# Patient Record
Sex: MALE | Race: WHITE | NOT HISPANIC OR LATINO | Employment: FULL TIME | URBAN - METROPOLITAN AREA
[De-identification: names, ages, dates, MRNs, and addresses within clinical notes are randomized per-mention and may not be internally consistent; named-entity substitution may affect disease eponyms.]

---

## 2020-09-08 ENCOUNTER — TELEPHONE (OUTPATIENT)
Dept: FAMILY MEDICINE | Facility: CLINIC | Age: 35
End: 2020-09-08

## 2020-09-08 NOTE — TELEPHONE ENCOUNTER
"Spoke with pt, I told him that since he is a new pt that he would need to be seen in clinic, but with the symptoms that he has and could be positive for covid, I had to tell him that it [facility] policy that we cant still see him virtually that he would need to go to urgent care. He was upset, he states its unfortunate and that he will deal with it himself, I did state that we will have to follow up with him after the covid test comes back and he stated " no im fine". I canceled the appointment and we ended call.    "

## 2020-09-09 ENCOUNTER — OFFICE VISIT (OUTPATIENT)
Dept: URGENT CARE | Facility: CLINIC | Age: 35
End: 2020-09-09
Payer: COMMERCIAL

## 2020-09-09 VITALS
OXYGEN SATURATION: 99 % | WEIGHT: 212 LBS | TEMPERATURE: 98 F | RESPIRATION RATE: 18 BRPM | DIASTOLIC BLOOD PRESSURE: 84 MMHG | HEART RATE: 87 BPM | BODY MASS INDEX: 32.13 KG/M2 | HEIGHT: 68 IN | SYSTOLIC BLOOD PRESSURE: 139 MMHG

## 2020-09-09 DIAGNOSIS — R05.9 COUGH: Primary | ICD-10-CM

## 2020-09-09 DIAGNOSIS — U07.1 COVID-19 VIRUS DETECTED: ICD-10-CM

## 2020-09-09 LAB
CTP QC/QA: YES
SARS-COV-2 RDRP RESP QL NAA+PROBE: POSITIVE

## 2020-09-09 PROCEDURE — 99203 PR OFFICE/OUTPT VISIT, NEW, LEVL III, 30-44 MIN: ICD-10-PCS | Mod: S$GLB,,, | Performed by: FAMILY MEDICINE

## 2020-09-09 PROCEDURE — U0002: ICD-10-PCS | Mod: S$GLB,,, | Performed by: FAMILY MEDICINE

## 2020-09-09 PROCEDURE — 99203 OFFICE O/P NEW LOW 30 MIN: CPT | Mod: S$GLB,,, | Performed by: FAMILY MEDICINE

## 2020-09-09 PROCEDURE — U0002 COVID-19 LAB TEST NON-CDC: HCPCS | Mod: S$GLB,,, | Performed by: FAMILY MEDICINE

## 2020-09-09 RX ORDER — ALBUTEROL SULFATE 90 UG/1
2 AEROSOL, METERED RESPIRATORY (INHALATION) EVERY 6 HOURS PRN
Qty: 18 G | Refills: 2 | Status: SHIPPED | OUTPATIENT
Start: 2020-09-09 | End: 2022-08-09

## 2020-09-09 RX ORDER — PROMETHAZINE HYDROCHLORIDE 6.25 MG/5ML
SYRUP ORAL
Qty: 120 ML | Refills: 1 | Status: SHIPPED | OUTPATIENT
Start: 2020-09-09 | End: 2022-08-09 | Stop reason: ALTCHOICE

## 2020-09-09 NOTE — PROGRESS NOTES
"Subjective:       Patient ID: Quinn Grey is a 35 y.o. male.    Vitals:  height is 5' 8" (1.727 m) and weight is 96.2 kg (212 lb). His temperature is 97.6 °F (36.4 °C). His blood pressure is 139/84 and his pulse is 87. His respiration is 18 and oxygen saturation is 99%.     Chief Complaint: Cough    Pt presents today with fatigue, productive cough, rhinorrhea, myalgias x1.5 weeks. Pt wife tested pos for covid about 2 weeks.    Cough  This is a new problem. The current episode started in the past 7 days. The problem has been gradually worsening. The problem occurs hourly. The cough is productive of sputum. Associated symptoms include headaches, myalgias, nasal congestion, postnasal drip and rhinorrhea. Pertinent negatives include no chest pain, chills, ear congestion, ear pain, fever, heartburn, hemoptysis, rash, sore throat, shortness of breath, sweats, weight loss or wheezing. Nothing aggravates the symptoms. Treatments tried: thermaflu. The treatment provided mild relief. There is no history of asthma, bronchiectasis, bronchitis, COPD, emphysema, environmental allergies or pneumonia.       Constitution: Negative for chills, fatigue and fever.   HENT: Positive for postnasal drip. Negative for ear pain, congestion and sore throat.    Neck: Negative for painful lymph nodes.   Cardiovascular: Negative for chest pain and leg swelling.   Eyes: Negative for double vision and blurred vision.   Respiratory: Positive for cough. Negative for bloody sputum, shortness of breath and wheezing.    Gastrointestinal: Negative for nausea, vomiting, diarrhea and heartburn.   Genitourinary: Negative for dysuria, frequency and urgency.   Musculoskeletal: Positive for muscle ache. Negative for joint pain, joint swelling and muscle cramps.   Skin: Negative for color change, pale and rash.   Allergic/Immunologic: Negative for environmental allergies and seasonal allergies.   Neurological: Positive for headaches. Negative for dizziness, " history of vertigo, light-headedness and passing out.   Hematologic/Lymphatic: Negative for swollen lymph nodes, easy bruising/bleeding and history of blood clots. Does not bruise/bleed easily.   Psychiatric/Behavioral: Negative for nervous/anxious, sleep disturbance and depression. The patient is not nervous/anxious.        Objective:      Physical Exam   Constitutional: He is oriented to person, place, and time. He appears well-developed.   HENT:   Head: Normocephalic and atraumatic.   Ears:   Right Ear: External ear normal.   Left Ear: External ear normal.   Nose: Nose normal.   Mouth/Throat: Oropharynx is clear and moist.   Eyes: Pupils are equal, round, and reactive to light. Conjunctivae, EOM and lids are normal.   Neck: Trachea normal, full passive range of motion without pain and phonation normal. Neck supple.   Cardiovascular: Normal rate.   Pulmonary/Chest: Effort normal and breath sounds normal.   Musculoskeletal: Normal range of motion.   Neurological: He is alert and oriented to person, place, and time.   Skin: Skin is warm, dry and intact. Psychiatric: His speech is normal and behavior is normal. Judgment and thought content normal.   Nursing note and vitals reviewed.        Assessment:       1. Cough        Plan:         Cough  -     POCT COVID-19 Rapid Screening    Other orders  -     albuterol (PROVENTIL/VENTOLIN HFA) 90 mcg/actuation inhaler; Inhale 2 puffs into the lungs every 6 (six) hours as needed for Wheezing. Rescue  Dispense: 18 g; Refill: 2  -     promethazine (PHENERGAN) 6.25 mg/5 mL syrup; 10mL at night as needed  Dispense: 120 mL; Refill: 1

## 2020-09-09 NOTE — LETTER
September 9, 2020      [facility] [address], [address]  [facility] [address]  Phone: [phone]  Fax: [phone]       Patient: Quinn Grey   YOB: 1985  Date of Visit: 09/09/2020    To Whom It May Concern:    Edmar Grey  was at [facility] on 09/09/2020.  If you have any questions or concerns, or if I can be of further assistance, please do not hesitate to contact me.    Sincerely,    James Jones MD

## 2021-01-04 RX ORDER — TRAMADOL HYDROCHLORIDE 50 MG/1
TABLET ORAL
COMMUNITY
Start: 2020-11-02 | End: 2022-08-09 | Stop reason: ALTCHOICE

## 2021-01-04 RX ORDER — AMOXICILLIN AND CLAVULANATE POTASSIUM 500; 125 MG/1; MG/1
TABLET, FILM COATED ORAL
COMMUNITY
Start: 2020-09-28 | End: 2022-08-09 | Stop reason: ALTCHOICE

## 2021-01-04 RX ORDER — AMOXICILLIN 500 MG/1
CAPSULE ORAL
COMMUNITY
Start: 2021-01-01 | End: 2022-08-09 | Stop reason: ALTCHOICE

## 2021-01-04 RX ORDER — IBUPROFEN 800 MG/1
800 TABLET ORAL EVERY 8 HOURS PRN
COMMUNITY
Start: 2021-01-01 | End: 2022-08-09 | Stop reason: ALTCHOICE

## 2021-01-04 RX ORDER — HYDROCODONE BITARTRATE AND ACETAMINOPHEN 5; 325 MG/1; MG/1
TABLET ORAL
COMMUNITY
Start: 2020-10-30 | End: 2022-08-09 | Stop reason: ALTCHOICE

## 2021-01-04 RX ORDER — BUPROPION HYDROCHLORIDE 150 MG/1
150 TABLET ORAL
COMMUNITY
Start: 2018-08-22 | End: 2022-11-08 | Stop reason: SDUPTHER

## 2021-01-04 RX ORDER — METHYLPREDNISOLONE 4 MG/1
TABLET ORAL
COMMUNITY
Start: 2021-01-01 | End: 2022-08-09 | Stop reason: ALTCHOICE

## 2021-05-10 ENCOUNTER — PATIENT MESSAGE (OUTPATIENT)
Dept: RESEARCH | Facility: HOSPITAL | Age: 36
End: 2021-05-10

## 2021-11-17 ENCOUNTER — OCCUPATIONAL HEALTH (OUTPATIENT)
Dept: URGENT CARE | Facility: CLINIC | Age: 36
End: 2021-11-17

## 2021-11-17 DIAGNOSIS — Z02.83 ENCOUNTER FOR DRUG SCREENING: Primary | ICD-10-CM

## 2021-11-17 LAB
CTP QC/QA: YES
POC 10 PANEL DRUG SCREEN: NEGATIVE

## 2021-11-17 PROCEDURE — 80305 POCT RAPID DRUG SCREEN 10 PANEL: ICD-10-PCS | Mod: S$GLB,,, | Performed by: FAMILY MEDICINE

## 2021-11-17 PROCEDURE — 80305 DRUG TEST PRSMV DIR OPT OBS: CPT | Mod: S$GLB,,, | Performed by: FAMILY MEDICINE

## 2021-12-28 ENCOUNTER — CLINICAL SUPPORT (OUTPATIENT)
Dept: URGENT CARE | Facility: CLINIC | Age: 36
End: 2021-12-28
Payer: COMMERCIAL

## 2021-12-28 DIAGNOSIS — Z20.822 COVID-19 RULED OUT: Primary | ICD-10-CM

## 2021-12-28 LAB
CTP QC/QA: YES
SARS-COV-2 RDRP RESP QL NAA+PROBE: NEGATIVE

## 2021-12-28 PROCEDURE — U0002 COVID-19 LAB TEST NON-CDC: HCPCS | Mod: QW,S$GLB,, | Performed by: FAMILY MEDICINE

## 2021-12-28 PROCEDURE — 99211 PR OFFICE/OUTPT VISIT, EST, LEVL I: ICD-10-PCS | Mod: S$GLB,,, | Performed by: FAMILY MEDICINE

## 2021-12-28 PROCEDURE — U0002: ICD-10-PCS | Mod: QW,S$GLB,, | Performed by: FAMILY MEDICINE

## 2021-12-28 PROCEDURE — 99211 OFF/OP EST MAY X REQ PHY/QHP: CPT | Mod: S$GLB,,, | Performed by: FAMILY MEDICINE

## 2022-02-24 ENCOUNTER — OFFICE VISIT (OUTPATIENT)
Dept: URGENT CARE | Facility: CLINIC | Age: 37
End: 2022-02-24
Payer: COMMERCIAL

## 2022-02-24 VITALS
BODY MASS INDEX: 31.22 KG/M2 | WEIGHT: 206 LBS | SYSTOLIC BLOOD PRESSURE: 136 MMHG | DIASTOLIC BLOOD PRESSURE: 86 MMHG | HEIGHT: 68 IN | HEART RATE: 73 BPM | RESPIRATION RATE: 16 BRPM | TEMPERATURE: 99 F | OXYGEN SATURATION: 98 %

## 2022-02-24 DIAGNOSIS — Z76.89 ENCOUNTER TO ESTABLISH CARE: ICD-10-CM

## 2022-02-24 DIAGNOSIS — R11.2 NAUSEA AND VOMITING, INTRACTABILITY OF VOMITING NOT SPECIFIED, UNSPECIFIED VOMITING TYPE: ICD-10-CM

## 2022-02-24 DIAGNOSIS — R10.9 ABDOMINAL CRAMPS: Primary | ICD-10-CM

## 2022-02-24 DIAGNOSIS — R19.7 DIARRHEA, UNSPECIFIED TYPE: ICD-10-CM

## 2022-02-24 LAB
CTP QC/QA: YES
CTP QC/QA: YES
POC MOLECULAR INFLUENZA A AGN: NEGATIVE
POC MOLECULAR INFLUENZA B AGN: NEGATIVE
SARS-COV-2 RDRP RESP QL NAA+PROBE: NEGATIVE

## 2022-02-24 PROCEDURE — 3075F SYST BP GE 130 - 139MM HG: CPT | Mod: CPTII,S$GLB,, | Performed by: PHYSICIAN ASSISTANT

## 2022-02-24 PROCEDURE — 3075F PR MOST RECENT SYSTOLIC BLOOD PRESS GE 130-139MM HG: ICD-10-PCS | Mod: CPTII,S$GLB,, | Performed by: PHYSICIAN ASSISTANT

## 2022-02-24 PROCEDURE — 1160F RVW MEDS BY RX/DR IN RCRD: CPT | Mod: CPTII,S$GLB,, | Performed by: PHYSICIAN ASSISTANT

## 2022-02-24 PROCEDURE — 87502 INFLUENZA DNA AMP PROBE: CPT | Mod: QW,S$GLB,, | Performed by: PHYSICIAN ASSISTANT

## 2022-02-24 PROCEDURE — 3079F PR MOST RECENT DIASTOLIC BLOOD PRESSURE 80-89 MM HG: ICD-10-PCS | Mod: CPTII,S$GLB,, | Performed by: PHYSICIAN ASSISTANT

## 2022-02-24 PROCEDURE — 87502 POCT INFLUENZA A/B MOLECULAR: ICD-10-PCS | Mod: QW,S$GLB,, | Performed by: PHYSICIAN ASSISTANT

## 2022-02-24 PROCEDURE — 99213 OFFICE O/P EST LOW 20 MIN: CPT | Mod: S$GLB,CS,, | Performed by: PHYSICIAN ASSISTANT

## 2022-02-24 PROCEDURE — 1160F PR REVIEW ALL MEDS BY PRESCRIBER/CLIN PHARMACIST DOCUMENTED: ICD-10-PCS | Mod: CPTII,S$GLB,, | Performed by: PHYSICIAN ASSISTANT

## 2022-02-24 PROCEDURE — 99213 PR OFFICE/OUTPT VISIT, EST, LEVL III, 20-29 MIN: ICD-10-PCS | Mod: S$GLB,CS,, | Performed by: PHYSICIAN ASSISTANT

## 2022-02-24 PROCEDURE — 1159F MED LIST DOCD IN RCRD: CPT | Mod: CPTII,S$GLB,, | Performed by: PHYSICIAN ASSISTANT

## 2022-02-24 PROCEDURE — U0002 COVID-19 LAB TEST NON-CDC: HCPCS | Mod: QW,S$GLB,, | Performed by: PHYSICIAN ASSISTANT

## 2022-02-24 PROCEDURE — 3008F PR BODY MASS INDEX (BMI) DOCUMENTED: ICD-10-PCS | Mod: CPTII,S$GLB,, | Performed by: PHYSICIAN ASSISTANT

## 2022-02-24 PROCEDURE — 3079F DIAST BP 80-89 MM HG: CPT | Mod: CPTII,S$GLB,, | Performed by: PHYSICIAN ASSISTANT

## 2022-02-24 PROCEDURE — U0002: ICD-10-PCS | Mod: QW,S$GLB,, | Performed by: PHYSICIAN ASSISTANT

## 2022-02-24 PROCEDURE — 1159F PR MEDICATION LIST DOCUMENTED IN MEDICAL RECORD: ICD-10-PCS | Mod: CPTII,S$GLB,, | Performed by: PHYSICIAN ASSISTANT

## 2022-02-24 PROCEDURE — 3008F BODY MASS INDEX DOCD: CPT | Mod: CPTII,S$GLB,, | Performed by: PHYSICIAN ASSISTANT

## 2022-02-24 RX ORDER — DICYCLOMINE HYDROCHLORIDE 20 MG/1
20 TABLET ORAL EVERY 6 HOURS
Qty: 60 TABLET | Refills: 0 | Status: SHIPPED | OUTPATIENT
Start: 2022-02-24 | End: 2022-08-09

## 2022-02-24 RX ORDER — SUCRALFATE 1 G/1
1 TABLET ORAL 4 TIMES DAILY
Qty: 60 TABLET | Refills: 0 | Status: SHIPPED | OUTPATIENT
Start: 2022-02-24 | End: 2022-08-09

## 2022-02-24 RX ORDER — ONDANSETRON 8 MG/1
8 TABLET, ORALLY DISINTEGRATING ORAL EVERY 6 HOURS PRN
Qty: 30 TABLET | Refills: 0 | Status: SHIPPED | OUTPATIENT
Start: 2022-02-24 | End: 2022-08-09 | Stop reason: ALTCHOICE

## 2022-02-24 RX ORDER — DIPHENOXYLATE HYDROCHLORIDE AND ATROPINE SULFATE 2.5; .025 MG/1; MG/1
1 TABLET ORAL 4 TIMES DAILY PRN
Qty: 30 TABLET | Refills: 0 | Status: SHIPPED | OUTPATIENT
Start: 2022-02-24 | End: 2022-08-09 | Stop reason: ALTCHOICE

## 2022-02-24 NOTE — PROGRESS NOTES
"Subjective:       Patient ID: Quinn Grey is a 37 y.o. male.    Vitals:  height is 5' 8" (1.727 m) and weight is 93.4 kg (206 lb). His temperature is 98.6 °F (37 °C). His blood pressure is 136/86 and his pulse is 73. His respiration is 16 and oxygen saturation is 98%.     Chief Complaint: Abdominal Pain    Patient presents to urgent care for stomach issues x 3-4 days. Patient reports watery diarrhea > 5 episodes daily and on and off N/V. Patient has been taking Zofran RX and OTC Tylenol with mild relief. Patient reports no new changes in diet, medications, etc. Patient reports no recent travel. Patient reports no PSHx of abdominal surgeries. Patient currently denies fever, chills, body aches, CP, SOB, wheezing, dysuria, urinary frequency/urgency, blood in stool, blurry vision, dizziness or syncope.     Diarrhea   This is a new problem. The current episode started in the past 7 days. The problem occurs 5 to 10 times per day. The problem has been gradually worsening. The stool consistency is described as watery. The patient states that diarrhea awakens him from sleep. Associated symptoms include abdominal pain, bloating, chills, headaches, sweats and vomiting. Pertinent negatives include no arthralgias, coughing, fever, increased  flatus, myalgias, URI or weight loss. Nothing aggravates the symptoms. Risk factors include ill contacts (at home). Treatments tried: zofran and tylenol. The treatment provided mild relief.       Constitution: Positive for appetite change, chills and sweating. Negative for fatigue and fever.   HENT: Negative for ear pain, drooling, congestion, sore throat, trouble swallowing and voice change.    Neck: Negative for neck pain, neck stiffness, painful lymph nodes and neck swelling.   Cardiovascular: Negative for chest pain, leg swelling, palpitations, sob on exertion and passing out.   Eyes: Negative for eye discharge, eye itching, eye pain, eye redness and eyelid swelling.   Respiratory: " Negative for chest tightness, cough, sputum production, bloody sputum, shortness of breath, stridor and wheezing.    Gastrointestinal: Positive for abdominal pain, abdominal bloating, nausea, vomiting and diarrhea. Negative for constipation, bright red blood in stool, dark colored stools, rectal bleeding and heartburn.   Genitourinary: Negative for dysuria, frequency, urgency, flank pain, hematuria and pelvic pain.   Musculoskeletal: Negative for joint pain, joint swelling, abnormal ROM of joint, pain with walking, muscle cramps and muscle ache.   Skin: Negative for rash and hives.   Allergic/Immunologic: Negative for hives, itching and sneezing.   Neurological: Positive for headaches. Negative for dizziness, light-headedness, passing out, facial drooping, speech difficulty, loss of balance, altered mental status, loss of consciousness, numbness and seizures.   Hematologic/Lymphatic: Negative for swollen lymph nodes.   Psychiatric/Behavioral: Negative for altered mental status and nervous/anxious. The patient is not nervous/anxious.        Objective:      Physical Exam   Constitutional: He is oriented to person, place, and time. He appears well-developed.  Non-toxic appearance. He does not appear ill. No distress.   HENT:   Head: Normocephalic and atraumatic.   Ears:   Right Ear: External ear normal.   Left Ear: External ear normal.   Nose: Nose normal.   Mouth/Throat: Uvula is midline, oropharynx is clear and moist and mucous membranes are normal. No oropharyngeal exudate, posterior oropharyngeal edema or posterior oropharyngeal erythema.   Eyes: Conjunctivae and lids are normal.   Neck: Trachea normal. Neck supple.   Cardiovascular: Normal rate, regular rhythm and normal heart sounds.   Pulmonary/Chest: Effort normal and breath sounds normal. No accessory muscle usage or stridor. No respiratory distress. He has no decreased breath sounds. He has no wheezes. He has no rhonchi. He has no rales.   Abdominal: Normal  appearance and bowel sounds are normal. He exhibits no distension, no abdominal bruit, no pulsatile midline mass and no mass. Soft. There is no abdominal tenderness. There is no rebound, no guarding, no tenderness at McBurney's point, negative Sorto's sign, no left CVA tenderness and no right CVA tenderness.   Musculoskeletal: Normal range of motion.         General: Normal range of motion.   Lymphadenopathy:     He has no cervical adenopathy.   Neurological: He is alert and oriented to person, place, and time. He has normal strength.   Skin: Skin is warm, dry, intact, not diaphoretic, not pale and no rash. Capillary refill takes less than 2 seconds.   Psychiatric: His speech is normal and behavior is normal. Judgment and thought content normal.   Nursing note and vitals reviewed.        Results for orders placed or performed in visit on 02/24/22   POCT Influenza A/B MOLECULAR   Result Value Ref Range    POC Molecular Influenza A Ag Negative Negative, Not Reported    POC Molecular Influenza B Ag Negative Negative, Not Reported     Acceptable Yes    POCT COVID-19 Rapid Screening   Result Value Ref Range    POC Rapid COVID Negative Negative     Acceptable Yes        Assessment:       1. Abdominal cramps    2. Diarrhea, unspecified type    3. Nausea and vomiting, intractability of vomiting not specified, unspecified vomiting type    4. Encounter to establish care          Plan:     Discussed COVID-19 and flu results with patient. Advised close follow-up with PCP and/or Specialist for further evaluation as needed. ER precautions given to patient as well. Patient aware, verbalized understanding and agreed with plan of care.    Abdominal cramps  -     sucralfate (CARAFATE) 1 gram tablet; Take 1 tablet (1 g total) by mouth 4 (four) times daily.  Dispense: 60 tablet; Refill: 0  -     dicyclomine (BENTYL) 20 mg tablet; Take 1 tablet (20 mg total) by mouth every 6 (six) hours.  Dispense: 60  tablet; Refill: 0  -     diphenoxylate-atropine 2.5-0.025 mg (LOMOTIL) 2.5-0.025 mg per tablet; Take 1 tablet by mouth 4 (four) times daily as needed for Diarrhea.  Dispense: 30 tablet; Refill: 0    Diarrhea, unspecified type  -     POCT Influenza A/B MOLECULAR  -     POCT COVID-19 Rapid Screening  -     diphenoxylate-atropine 2.5-0.025 mg (LOMOTIL) 2.5-0.025 mg per tablet; Take 1 tablet by mouth 4 (four) times daily as needed for Diarrhea.  Dispense: 30 tablet; Refill: 0    Nausea and vomiting, intractability of vomiting not specified, unspecified vomiting type  -     POCT Influenza A/B MOLECULAR  -     POCT COVID-19 Rapid Screening  -     ondansetron (ZOFRAN-ODT) 8 MG TbDL; Take 1 tablet (8 mg total) by mouth every 6 (six) hours as needed (nausea).  Dispense: 30 tablet; Refill: 0    Encounter to establish care  -     Ambulatory referral/consult to Internal Medicine      Patient Instructions   You must understand that you've received an Urgent Care treatment only and that you may be released before all your medical problems are known or treated. You, the patient, will arrange for follow up care as instructed.  Follow up with your PCP or specialty clinic as directed if not improved or as needed. You can call [phone] to schedule an appointment with the appropriate provider.  If your condition worsens we recommend that you receive another evaluation at the Emergency Department for any concerns or worsening of condition.  Patient aware and verbalized understanding.    Reviewed COVID-19 and flu results with patient.  Counseled patient and answered questions in regards to COVID-19 testing.  Advised patient to go home, treat symptoms with over-the-counter (OTC) medications and avoid contact with others at this time.  Increase fluids and rest is important.  Humidifier use at home.  Rest and hydrate with plenty of fluids.  Recommended very bland diet for the next 24-72 hours.   Avoid spicy foods and fast food/greasy  food until symptoms have resolved.  Bentyl and Carafate RX as needed for abdominal cramping/spasms.  Zofran RX as needed for nausea/vomiting.  Lomotil RX as needed for diarrhea and if not relieved with OTC Imodium or if experiencing constipation.  OTC Tylenol or Ibuprofen every 4-6 hours as needed for headache, fever or pain.  Info given for virtual visit, covid 19 information line, state info line.   Advised patient to follow-up with PCP and/or Specialist for further evaluation as needed.   Strict ER precautions given to patient.  Follow local/state guidelines per covid emergency.   Patient aware, verbalized understanding and agreed with plan of care.    CDC RECOMMENDATIONS  --IF test results are NEGATIVE and NO known high risk exposure to covid-19 virus, you can be excluded from work/school until:  o MINIMUM OF 24 hours fever-free without the use of fever-reducing medications AND  o Improvement in symptoms (e.g. cough, shortness of breath, fatigue, GI symptoms, etc)     --IF YOU ARE BEING TESTED BECAUSE OF A HIGH RISK EXPOSURE, which the CDC defines as direct contact 6 feet or less for >15 minutes with a known positive person, you should follow CDC guidelines as well as your employer/school protocols for safely returning to work/school.   *Please be aware that there are False Negative possibilities with testing, so you should return to work/school based upon CDC guidelines, not simply a negative result, unless your employer/school has a different RTW protocol/guidance for you.     --IF test results are POSITIVE , you should be excluded from work/school until:  o At least 24 hours FEVER-FREE without the use of fever-reducing medications AND  o Improvement in symptoms (e.g., cough, shortness of breathing, fatigue, GI symptoms, etc) AND  o At least 5 days have passed since symptoms first appeared.    IF NOT IMPROVING, FOLLOW UP WITH VIRTUAL ONLINE VISIT WITH A PROVIDER 24/7 - FOR MORE INFORMATION OR TO DOWNLOAD THE  [facility], VISIT [facility]WHERE [facility]ARE [email or web address]  FOR 24/7 NURSE ADVICE, CALL [phone]  FOR COVID 19 RELATED QUESTIONS, CALL the [facility] covid hotline: [phone]  [place] FOR UP TO DATE INFORMATION: Text or dial 211, test keyword LACOVID -211 OR DIAL 211    HELPFUL EXTERNAL RESOURCES:  OFFICE OF PUBLIC HEALTH: [place] - http://ldh.la.gov/ and 1-521.142.4524  CENTER FOR DISEASE CONTROL - https://www.cdc.gov/   WORLD HEALTH ORGANIZATION (WHO) - https://www.who.int/   CDC WHEN TO QUARANTINE - https://www.cdc.gov/coronavirus/2019-ncov/if-you-are-sick/quarantine.html     INFO ABOUT [facility] COVID-19 RAPID TESTING:  This test utilizes isothermal nucleic acid amplification technology to detect the SARS-CoV-2 RdRp nucleic acid segment.   The analytical sensitivity (limit of detection) is 125 genome equivalents/mL.   A POSITIVE result implies infection with the SARS-CoV-2 virus; the patient is presumed to be contagious.     A NEGATIVE result means that SARS-CoV-2 nucleic acids are not present above the limit of detection.   A NEGATIVE result should be treated as presumptive. It does not rule out the possibility of COVID-19 and should not be the sole basis for treatment decisions.   This test is only for use under the Food and Drug Administration s Emergency Use Authorization (EUA).   Commercial kits are provided by [facility] [employer]. Performance characteristics of the EUA have been independently verified by [facility] Department of Pathology and Laboratory Medicine.   _________________________________________________________________   The authorized Fact Sheet for Healthcare Providers and the authorized Fact Sheet for Patients of the ID NOW COVID-19 are available on the FDA website:   https://www.fda.gov/media/541860/download  https://www.fda.gov/media/338519/download

## 2022-02-24 NOTE — PATIENT INSTRUCTIONS
You must understand that you've received an Urgent Care treatment only and that you may be released before all your medical problems are known or treated. You, the patient, will arrange for follow up care as instructed.  Follow up with your PCP or specialty clinic as directed if not improved or as needed. You can call [phone] to schedule an appointment with the appropriate provider.  If your condition worsens we recommend that you receive another evaluation at the Emergency Department for any concerns or worsening of condition.  Patient aware and verbalized understanding.    Reviewed COVID-19 and flu results with patient.  Counseled patient and answered questions in regards to COVID-19 testing.  Advised patient to go home, treat symptoms with over-the-counter (OTC) medications and avoid contact with others at this time.  Increase fluids and rest is important.  Humidifier use at home.  Rest and hydrate with plenty of fluids.  Recommended very bland diet for the next 24-72 hours.   Avoid spicy foods and fast food/greasy food until symptoms have resolved.  Bentyl and Carafate RX as needed for abdominal cramping/spasms.  Zofran RX as needed for nausea/vomiting.  Lomotil RX as needed for diarrhea and if not relieved with OTC Imodium or if experiencing constipation.  OTC Tylenol or Ibuprofen every 4-6 hours as needed for headache, fever or pain.  Info given for virtual visit, covid 19 information line, state info line.   Advised patient to follow-up with PCP and/or Specialist for further evaluation as needed.   Strict ER precautions given to patient.  Follow local/state guidelines per covid emergency.   Patient aware, verbalized understanding and agreed with plan of care.    CDC RECOMMENDATIONS  --IF test results are NEGATIVE and NO known high risk exposure to covid-19 virus, you can be excluded from work/school until:  o MINIMUM OF 24 hours fever-free without the use of fever-reducing medications AND  o Improvement  in symptoms (e.g. cough, shortness of breath, fatigue, GI symptoms, etc)     --IF YOU ARE BEING TESTED BECAUSE OF A HIGH RISK EXPOSURE, which the CDC defines as direct contact 6 feet or less for >15 minutes with a known positive person, you should follow CDC guidelines as well as your employer/school protocols for safely returning to work/school.   *Please be aware that there are False Negative possibilities with testing, so you should return to work/school based upon CDC guidelines, not simply a negative result, unless your employer/school has a different RTW protocol/guidance for you.     --IF test results are POSITIVE , you should be excluded from work/school until:  o At least 24 hours FEVER-FREE without the use of fever-reducing medications AND  o Improvement in symptoms (e.g., cough, shortness of breathing, fatigue, GI symptoms, etc) AND  o At least 5 days have passed since symptoms first appeared.    IF NOT IMPROVING, FOLLOW UP WITH VIRTUAL ONLINE VISIT WITH A PROVIDER 24/7 - FOR MORE INFORMATION OR TO DOWNLOAD THE [facility], VISIT [facility] ANYWHERE [facility] AT [email or web address]/ANYWHERE  FOR 24/7 NURSE ADVICE, CALL [phone]  FOR COVID 19 RELATED QUESTIONS, CALL the [facility] covid hotline: [phone]  [place] FOR UP TO DATE INFORMATION: Text or dial 211, test keyword LACOVID -328 OR DIAL 211    HELPFUL EXTERNAL RESOURCES:  OFFICE OF PUBLIC HEALTH: [place] - http://ldh.[place].gov/ and 1-215.889.7622  CENTER FOR DISEASE CONTROL - https://www.cdc.gov/   WORLD HEALTH ORGANIZATION (WHO) - https://www.who.int/   CDC WHEN TO QUARANTINE - https://www.cdc.gov/coronavirus/2019-ncov/if-you-are-sick/quarantine.html     INFO ABOUT [facility] COVID-19 RAPID TESTING:  This test utilizes isothermal nucleic acid amplification technology to detect the SARS-CoV-2 RdRp nucleic acid segment.   The analytical sensitivity (limit of detection) is 125 genome equivalents/mL.   A POSITIVE result implies infection with the SARS-CoV-2 virus; the  patient is presumed to be contagious.     A NEGATIVE result means that SARS-CoV-2 nucleic acids are not present above the limit of detection.   A NEGATIVE result should be treated as presumptive. It does not rule out the possibility of COVID-19 and should not be the sole basis for treatment decisions.   This test is only for use under the Food and Drug Administration s Emergency Use Authorization (EUA).   Commercial kits are provided by [employer]. Performance characteristics of the EUA have been independently verified by [facility] Department of Pathology and Laboratory Medicine.   _________________________________________________________________   The authorized Fact Sheet for Healthcare Providers and the authorized Fact Sheet for Patients of the ID NOW COVID-19 are available on the FDA website:   https://www.fda.gov/media/989120/download  https://www.fda.gov/media/406994/download

## 2022-08-04 ENCOUNTER — CLINICAL SUPPORT (OUTPATIENT)
Dept: OTHER | Facility: CLINIC | Age: 37
End: 2022-08-04
Payer: COMMERCIAL

## 2022-08-04 DIAGNOSIS — Z00.8 ENCOUNTER FOR OTHER GENERAL EXAMINATION: ICD-10-CM

## 2022-08-05 VITALS
DIASTOLIC BLOOD PRESSURE: 82 MMHG | HEIGHT: 69 IN | WEIGHT: 215 LBS | SYSTOLIC BLOOD PRESSURE: 128 MMHG | BODY MASS INDEX: 31.84 KG/M2

## 2022-08-05 LAB
GLUCOSE SERPL-MCNC: 100 MG/DL (ref 60–140)
HDLC SERPL-MCNC: 32 MG/DL
POC CHOLESTEROL, LDL (DOCK): 120.6 MG/DL
POC CHOLESTEROL, TOTAL: 171 MG/DL
TRIGL SERPL-MCNC: 99 MG/DL

## 2022-08-09 PROBLEM — U07.1 COVID-19: Status: ACTIVE | Noted: 2020-09-14

## 2022-08-09 PROBLEM — F90.9 ADHD (ATTENTION DEFICIT HYPERACTIVITY DISORDER): Status: ACTIVE | Noted: 2022-08-09

## 2022-08-09 PROBLEM — R10.9 ABDOMINAL CRAMPS: Status: ACTIVE | Noted: 2022-08-09

## 2022-08-09 PROBLEM — R10.9 ABDOMINAL CRAMPS: Status: RESOLVED | Noted: 2022-08-09 | Resolved: 2022-08-09

## 2022-08-09 PROBLEM — U07.1 COVID-19: Status: ACTIVE | Noted: 2020-09-09

## 2024-02-26 DIAGNOSIS — U07.1 COVID-19 VIRUS DETECTED: ICD-10-CM

## 2024-02-28 ENCOUNTER — PATIENT MESSAGE (OUTPATIENT)
Dept: RESEARCH | Facility: HOSPITAL | Age: 39
End: 2024-02-28
Payer: COMMERCIAL

## 2025-05-01 ENCOUNTER — TELEPHONE (OUTPATIENT)
Dept: PAIN MEDICINE | Facility: CLINIC | Age: 40
End: 2025-05-01

## 2025-05-01 ENCOUNTER — OFFICE VISIT (OUTPATIENT)
Dept: PAIN MEDICINE | Facility: CLINIC | Age: 40
End: 2025-05-01
Payer: COMMERCIAL

## 2025-05-01 VITALS
DIASTOLIC BLOOD PRESSURE: 87 MMHG | BODY MASS INDEX: 31.14 KG/M2 | WEIGHT: 205.5 LBS | HEIGHT: 68 IN | SYSTOLIC BLOOD PRESSURE: 122 MMHG | HEART RATE: 80 BPM

## 2025-05-01 DIAGNOSIS — M54.12 CERVICAL RADICULOPATHY: ICD-10-CM

## 2025-05-01 DIAGNOSIS — M54.2 CERVICALGIA: Primary | ICD-10-CM

## 2025-05-01 PROCEDURE — 3079F DIAST BP 80-89 MM HG: CPT | Mod: CPTII,S$GLB,, | Performed by: ANESTHESIOLOGY

## 2025-05-01 PROCEDURE — 1159F MED LIST DOCD IN RCRD: CPT | Mod: CPTII,S$GLB,, | Performed by: ANESTHESIOLOGY

## 2025-05-01 PROCEDURE — 3074F SYST BP LT 130 MM HG: CPT | Mod: CPTII,S$GLB,, | Performed by: ANESTHESIOLOGY

## 2025-05-01 PROCEDURE — 99204 OFFICE O/P NEW MOD 45 MIN: CPT | Mod: S$GLB,,, | Performed by: ANESTHESIOLOGY

## 2025-05-01 PROCEDURE — 3008F BODY MASS INDEX DOCD: CPT | Mod: CPTII,S$GLB,, | Performed by: ANESTHESIOLOGY

## 2025-05-01 PROCEDURE — 99999 PR PBB SHADOW E&M-EST. PATIENT-LVL IV: CPT | Mod: PBBFAC,,, | Performed by: ANESTHESIOLOGY

## 2025-05-01 PROCEDURE — 1160F RVW MEDS BY RX/DR IN RCRD: CPT | Mod: CPTII,S$GLB,, | Performed by: ANESTHESIOLOGY

## 2025-05-01 RX ORDER — HYDROCODONE BITARTRATE AND ACETAMINOPHEN 5; 325 MG/1; MG/1
1 TABLET ORAL EVERY 8 HOURS PRN
Qty: 20 TABLET | Refills: 0 | Status: SHIPPED | OUTPATIENT
Start: 2025-05-01

## 2025-05-01 NOTE — PROGRESS NOTES
[facility] New Patient Evaluation      Referred by: Dr. Liss Lopez    PCP:     CC:   Chief Complaint   Patient presents with    Neck Pain     Right down shoulder to arm  end at the thumb          5/1/2025     8:57 AM   Last 3 PDI Scores   Pain Disability Index (PDI) 44         HPI:   Quinn Grey is a 40 y.o. male patient who has a past medical history of Abdominal cramps, ADHD (attention deficit hyperactivity disorder), and COVID-19. He presents with neck and arm pain that began a few months ago after rotating and changing tires on his car. He experienced severe pain the following day. Pain radiates from his neck to his right arm, with numbness extending from his thumb to his neck. He reports weakness in his right arm. His right arm becomes completely numb when bending over. He also notes increased frequency of dropping objects and dexterity issues. His elbow is consistently sore and painful.  Pain severity fluctuates, with episodes of severe pain that significantly limit his activities. Symptoms worsen with bending over and work activities, and improve with rest. He has had to modify his activities due to the pain affecting his ability to perform normal tasks, including work.    He attempted PT but found it ineffective. He also saw a chiropractor, which did not fully resolve his symptoms. Another medical professional prescribed gabapentin (300mg), flexeril (muscle relaxant), and hydrocodone.  About 1.5 weeks ago, he experienced an acute exacerbation of pain. The pain improved slightly over a weekend of rest but worsened again when he returned to work. This cycle continued for about two weeks, prompting him to rest for a week, which provided some relief.       Pain Intervention History:      Past Spine Surgical History:      Past and current medications:  Antineuropathics: gabapentin   NSAIDs: ibuprofen  Physical therapy: yes, completed   Antidepressants:  Muscle relaxers:  Opioids: hydrocodone    Antiplatelets/Anticoagulants:    History:  Current Medications[1]    Past Medical History:   Diagnosis Date    Abdominal cramps 8/9/2022    ADHD (attention deficit hyperactivity disorder) 8/9/2022    COVID-19 9/14/2020       No past surgical history on file.    Family History   Problem Relation Name Age of Onset    No Known Problems Mother      No Known Problems Father      Dementia Maternal Grandfather      Prostate cancer Paternal Grandfather      Pacemaker/defibrilator Paternal Grandfather         Social History     Socioeconomic History    Marital status:    Tobacco Use    Smoking status: Former     Average packs/day: 0.3 packs/day for 23.7 years (5.9 ttl pk-yrs)     Types: Cigarettes     Start date: 2000    Smokeless tobacco: Never   Substance and Sexual Activity    Alcohol use: Yes     Comment: socially    Drug use: Not Currently    Sexual activity: Yes     Partners: Female     Social Drivers of Health     Financial Resource Strain: Patient Declined (2/17/2025)    Overall Financial Resource Strain (CARDIA)     Difficulty of Paying Living Expenses: Patient declined   Food Insecurity: Patient Declined (2/17/2025)    Hunger Vital Sign     Worried About Running Out of Food in the Last Year: Patient declined     Ran Out of Food in the Last Year: Patient declined   Transportation Needs: Patient Declined (2/17/2025)    PRAPARE - Transportation     Lack of Transportation (Medical): Patient declined     Lack of Transportation (Non-Medical): Patient declined   Physical Activity: Insufficiently Active (2/17/2025)    Exercise Vital Sign     Days of Exercise per Week: 3 days     Minutes of Exercise per Session: 20 min   Stress: Stress Concern Present (2/17/2025)    [employer] [place] [employer] - Occupational Stress Questionnaire     Feeling of Stress : To some extent   Housing Stability: Patient Declined (2/17/2025)    Housing Stability Vital Sign     Unable to Pay for Housing in the Last Year: Patient  "declined     Homeless in the Last Year: Patient declined       Review of patient's allergies indicates:   Allergen Reactions    Tramadol Other (See Comments)     Nausea        Review of Systems:  12 point review of systems is negative.    Physical Exam:  Vitals:    05/01/25 0858   BP: 122/87   Pulse: 80   Weight: 93.2 kg (205 lb 7.5 oz)   Height: 5' 8" (1.727 m)   PainSc:   7   PainLoc: Neck     Body mass index is 31.24 kg/m².    Gen: NAD  Psych: mood appropriate for given condition  HEENT: eyes anicteric   CV: RRR  HEENT: anicteric   Respiratory: non-labored, no signs of respiratory distress  Abd: non-distended  Skin: warm, dry and intact.  Gait: No antalgic gait.     Sensory:  Intact and symmetrical to light touch in C4-T1 dermatomes bilaterally, except decreased over the right thumb    Motor:    Right Left   C4 Shoulder Abduction  5  5   C5 Elbow Flexion    5  5   C6 Wrist Extension  5  5   C7 Elbow Extension   5  5   C8/T1 Hand Intrinsics   5  5      Right Left   Triceps DTR 2+ 2+   Biceps DTR 0 2+        Patellar DTR 1+ 1+        Edgar Absent  Absent                 Labs:  No results found for: "LABA1C", "HGBA1C"    Lab Results   Component Value Date    WBC 5.36 04/16/2024    HGB 13.9 (L) 04/16/2024    HCT 42.2 04/16/2024    MCV 86 04/16/2024     04/16/2024           Imaging:  MRI cervical spine 4/11/25  FINDINGS:  Posterior fossa which is visualized within normal limits.  Spinal cord is of normal caliber without evidence for signal abnormality.  No evidence for volume loss.     Straightening the normal lordotic curvature.  The alignment is normal.  The vertebral heights and intervertebral disc spaces maintained.  No evidence for compression deformity, fracture, or subluxation.  Marrow signal pattern is normal.  Disc desiccation is identified without significant intervertebral disc space narrowing.  Predental space and prevertebral soft tissues are grossly normal.     C2-C3: No significant " abnormality.  C3-C4: No significant abnormality.  C4-C5 no significant abnormality.  C5-C6: Central disc protrusion eccentric to the right causing minimal moderate central canal stenosis.  Deformity the spinal cord is identified without evidence for signal changes.  Uncovertebral facet arthropathy without significant neural foraminal narrowing.  C6-C7: No significant abnormality.  C7-T1: No significant abnormality.     The visualized soft tissues of neck are grossly normal.  The T2 flow voids are normal.  Slightly atretic appearance of the right vertebral artery.    Diagnosis:  1. Cervicalgia    2. Cervical radiculopathy  -     Ambulatory referral/consult to Pain Clinic  -     Ambulatory referral/consult to Neurosurgery; Future; Expected date: 05/08/2025  -     HYDROcodone-acetaminophen (NORCO) 5-325 mg per tablet; Take 1 tablet by mouth every 8 (eight) hours as needed for Pain.  Dispense: 20 tablet; Refill: 0          Quinn Grey is a 40 y.o. male patient who has a past medical history of Abdominal cramps, ADHD (attention deficit hyperactivity disorder), and COVID-19. He presents with neck and arm pain that began a few months ago after rotating and changing tires on his car. He experienced severe pain the following day. Pain radiates from his neck to his right arm, with numbness extending from his thumb to his neck. He reports weakness in his right arm. His right arm becomes completely numb when bending over. He also notes increased frequency of dropping objects and dexterity issues. His elbow is consistently sore and painful.  Pain severity fluctuates, with episodes of severe pain that significantly limit his activities. Symptoms worsen with bending over and work activities, and improve with rest. He has had to modify his activities due to the pain affecting his ability to perform normal tasks, including work.    He attempted PT but found it ineffective. He also saw a chiropractor, which did not fully resolve  his symptoms. Another medical professional prescribed gabapentin (300mg), flexeril (muscle relaxant), and hydrocodone.  About 1.5 weeks ago, he experienced an acute exacerbation of pain. The pain improved slightly over a weekend of rest but worsened again when he returned to work. This cycle continued for about two weeks, prompting him to rest for a week, which provided some relief.     - on exam he appears to have full strength in his upper extremities.  He has decreased sensation to light touch over the right thumb.  He has an absent right biceps DTR  - I independently reviewed his cervical MRI and at C5-6 he has a large right paracentral disc protrusion causing severe right lateral recess narrowing with impingement on the spinal cord.  There does not appear to be any signal change in the spinal cord  - over the past 2 months the patient has participated in both formal physical therapy and chiropractic care without any significant improvement in his pain.  He has also been trying muscle relaxants, gabapentin without any significant relief.  He has had some transient relief with hydrocodone.  - I am going to refill hydrocodone for him today to use on an as needed basis for his severe pain  - his pain is significant and limiting his mobility and interfering the quality of his life.  We are going to schedule for cervical C7-T1 ELIAS. The risks and benefits of this intervention, and alternative therapies were discussed with the patient.  The discussion of risks included infection, bleeding, need for additional procedures or surgery, nerve damage.  Questions regarding the procedure, risks, expected outcome, and possible side effects were solicited and answered to the patient's satisfaction.  Quinn Grey wishes to proceed with the injection or procedure.  Written consent was obtained.  - given the significant protrusion and his dexterity issues as he is right-hand dominant but reports sometimes he is dropping objects I  would also like him to see 1 of our neurosurgeons for an evaluation.  I have placed a referral for him.      : Not applicable    Mayco Guadarrama M.D.  Interventional Pain Medicine / Anesthesiology    This note was completed with dictation software and grammatical errors may exist.         [1]   Current Outpatient Medications:     cyclobenzaprine (FLEXERIL) 10 MG tablet, Take 1 tablet (10 mg total) by mouth nightly as needed for Muscle spasms., Disp: 30 tablet, Rfl: 0    gabapentin (NEURONTIN) 300 MG capsule, Take 1 capsule (300 mg total) by mouth every evening., Disp: 30 capsule, Rfl: 11    zolpidem (AMBIEN) 10 mg Tab, TAKE 1 TABLET(10 MG) BY MOUTH EVERY NIGHT AS NEEDED FOR INSOMNIA, Disp: 30 tablet, Rfl: 5    HYDROcodone-acetaminophen (NORCO) 5-325 mg per tablet, Take 1 tablet by mouth every 8 (eight) hours as needed for Pain., Disp: 20 tablet, Rfl: 0    ibuprofen (ADVIL,MOTRIN) 800 MG tablet, Take 1 tablet (800 mg total) by mouth 3 (three) times daily as needed for Pain., Disp: 30 tablet, Rfl: 0    LORazepam (ATIVAN) 1 MG tablet, Take 1 tablet (1 mg total) by mouth daily as needed for Anxiety., Disp: 30 tablet, Rfl: 0

## 2025-05-01 NOTE — TELEPHONE ENCOUNTER
Physician - Dr Guadarrama    Type of Procedure/Injection - Cervical Epidural  C7/T1           Laterality - NA      Priority - High      Anxiolysis- RNIV      Need to hold medication - No      Clearance needed - No      Follow up - 2 week

## 2025-05-02 DIAGNOSIS — M54.12 CERVICAL RADICULOPATHY: Primary | ICD-10-CM

## 2025-05-02 RX ORDER — SODIUM CHLORIDE, SODIUM LACTATE, POTASSIUM CHLORIDE, CALCIUM CHLORIDE 600; 310; 30; 20 MG/100ML; MG/100ML; MG/100ML; MG/100ML
INJECTION, SOLUTION INTRAVENOUS CONTINUOUS
OUTPATIENT
Start: 2025-05-02

## 2025-05-07 ENCOUNTER — OFFICE VISIT (OUTPATIENT)
Dept: NEUROSURGERY | Facility: CLINIC | Age: 40
End: 2025-05-07
Payer: COMMERCIAL

## 2025-05-07 VITALS
HEART RATE: 59 BPM | RESPIRATION RATE: 18 BRPM | WEIGHT: 205 LBS | SYSTOLIC BLOOD PRESSURE: 115 MMHG | BODY MASS INDEX: 31.07 KG/M2 | HEIGHT: 68 IN | DIASTOLIC BLOOD PRESSURE: 81 MMHG

## 2025-05-07 DIAGNOSIS — M54.12 CERVICAL RADICULOPATHY: ICD-10-CM

## 2025-05-07 DIAGNOSIS — G99.2 STENOSIS OF CERVICAL SPINE WITH MYELOPATHY: Primary | ICD-10-CM

## 2025-05-07 DIAGNOSIS — R29.898 WEAKNESS OF RIGHT UPPER EXTREMITY: ICD-10-CM

## 2025-05-07 DIAGNOSIS — M48.02 STENOSIS OF CERVICAL SPINE WITH MYELOPATHY: Primary | ICD-10-CM

## 2025-05-07 PROCEDURE — 1159F MED LIST DOCD IN RCRD: CPT | Mod: CPTII,S$GLB,, | Performed by: STUDENT IN AN ORGANIZED HEALTH CARE EDUCATION/TRAINING PROGRAM

## 2025-05-07 PROCEDURE — 3074F SYST BP LT 130 MM HG: CPT | Mod: CPTII,S$GLB,, | Performed by: STUDENT IN AN ORGANIZED HEALTH CARE EDUCATION/TRAINING PROGRAM

## 2025-05-07 PROCEDURE — 99205 OFFICE O/P NEW HI 60 MIN: CPT | Mod: S$GLB,,, | Performed by: STUDENT IN AN ORGANIZED HEALTH CARE EDUCATION/TRAINING PROGRAM

## 2025-05-07 PROCEDURE — 3008F BODY MASS INDEX DOCD: CPT | Mod: CPTII,S$GLB,, | Performed by: STUDENT IN AN ORGANIZED HEALTH CARE EDUCATION/TRAINING PROGRAM

## 2025-05-07 PROCEDURE — 3079F DIAST BP 80-89 MM HG: CPT | Mod: CPTII,S$GLB,, | Performed by: STUDENT IN AN ORGANIZED HEALTH CARE EDUCATION/TRAINING PROGRAM

## 2025-05-07 NOTE — PROGRESS NOTES
[facility] Neurosurgery [facility]  Clinic Consult     Consult Requested By: Liss Lopez MD, Mayco Guadarrama MD        SUBJECTIVE:     Chief Complaint:   Chief Complaint   Patient presents with    Establish Care     Cervical consult/ mri review       History of Present Illness:  Quinn Grey is a 40 y.o. male     History of Present Illness    CHIEF COMPLAINT:  Mr. Grey presents today with right arm pain, numbness, and paresthesias after changing a tire three months ago.    HISTORY OF PRESENT ILLNESS:  He reports right arm symptoms including loss of coordination, numbness, and paresthesias persisting for three months. MRI shows a large C5-6 disc herniation centrally, deforming the interior spinal cord with severe mechanical compression throughout the foramen, disc degeneration, and slight kyphosis at the segment. X-ray from February demonstrates slight kyphosis and normal alignment, without flexion extension views.      ROS:  Musculoskeletal: +limb pain  Neurological: +numbness, +tingling, +nerve pain, +decreased coordination          Pertinent and Recent history, provider evaluations, imaging and data reviewed in EPIC         Other pertinent ROS negative      Diagnostic Results:  I have independently reviewed the following imaging:    See above     C5-C6: Central disc protrusion eccentric to the right causing minimal moderate central canal stenosis.  Deformity the spinal cord is identified without evidence for signal changes.  Uncovertebral facet arthropathy without significant neural foraminal narrowing.     C6-C7: No significant abnormality.     C7-T1: No significant abnormality.     The visualized soft tissues of neck are grossly normal.  The T2 flow voids are normal.  Slightly atretic appearance of the right vertebral artery.     Impression:     Straightening the normal lordotic curvature.  Central disc protrusion causing central canal stenosis at C5-C6.  Level by level discussion  as above.        Electronically signed by:Dc Montemayor MD  Date:                                            04/11/2025  Time:                                           10:09        Exam Ended: 04/11/25 08:53 CDT       Review of patient's allergies indicates:   Allergen Reactions    Tramadol Other (See Comments)     Nausea        Past Medical History:   Diagnosis Date    Abdominal cramps 8/9/2022    ADHD (attention deficit hyperactivity disorder) 8/9/2022    COVID-19 9/14/2020     History reviewed. No pertinent surgical history.  Family History   Problem Relation Name Age of Onset    No Known Problems Mother      No Known Problems Father      Dementia Maternal Grandfather      Prostate cancer Paternal Grandfather      Pacemaker/defibrilator Paternal Grandfather       Social History[1]     Review of Systems:      Constitutional: no fever, chills or night sweats. No abrupt changes in weight   Eyes: no diplopia, lid drooping, loss of vision   ENT: no nasal congestion, sore throat, discharge  Respiratory: no cough, shortness of breath, or pain  Cardiovascular: no chest pain or palpitations   Gastrointestinal: no nausea or vomiting      OBJECTIVE:     Vital Signs (Most Recent):  Pulse: (!) 59 (05/07/25 1322)  Resp: 18 (05/07/25 1322)  BP: 115/81 (05/07/25 1322)    Physical Exam             Physical Exam:      General: well developed, well nourished, no distress. .  Mental Status: Awake, Alert, Oriented x 4  Language: No aphasia  Speech: No dysarthria  Head: normocephalic, atraumatic.  Neck: trachea midline, no JVD   Cardiovascular: no LE edema  Pulmonary: normal respirations, no signs of respiratory distress      Motor Strength:  No abnormal movements seen.     Strength  Deltoids Triceps Biceps Wrist Extension Wrist Flexion Hand  Interossei     Upper: R 5/5 4/5 5/5 5/5 5/5 5/5 5/5      L 5/5 5/5 5/5 5/5 5/5 5/5 5/5       Iliopsoas Quadriceps  Tibialis  anterior Gastro- cnemius EHL      Lower: R 5/5 5/5  5/5 5/5 5/5        L 5/5 5/5  5/5 5/5 5/5        SILT,PP dec lt/pp forarm and first 2 digit on right      DTR's: 2 + and symmetric in UE and LE    no tricep right  Edgar:+ bilaterally  Clonus: absent    Sit to Stand: normal  Gait: normal            ASSESSMENT/PLAN:     Stenosis of cervical spine with myelopathy    Cervical radiculopathy  -     MRI Cervical Spine Without Contrast  -     X-Ray Cervical Spine AP Lat with Flexion  Extension; Future; Expected date: 05/07/2025    Weakness of right upper extremity  -     MRI Cervical Spine Without Contrast  -     X-Ray Cervical Spine AP Lat with Flexion  Extension; Future; Expected date: 05/07/2025          Assessment & Plan    M54.12 Cervical radiculopathy  R29.898 Weakness of right upper extremity    IMPRESSION:  - 40-year-old male with 3-month history of right arm pain, numbness, and paresthesias following tire-changing incident.  - MRI shows large C5-6 disc herniation centrally deforming anterior spinal cord with significant mechanical compression throughout foramen, disc degeneration, and slight kyphosis.  - XR shows slight kyphosis and normal alignment, likely secondary to guarding without true unstable final spondylolisthesis.  - Discussed treatment options in detail, including risks, benefits, pros, and cons of operative and non-operative approaches.  - Surgical option: anterior cervical discectomy and instrumented fusion at C5-6.    CERVICAL RADICULOPATHY:  - Follow up to discuss treatment options with family.    WEAKNESS OF RIGHT UPPER EXTREMITY:  - Will address during the same follow-up discussion with family regarding management options, as this is likely related to the cervical radiculopathy.    PLAN SUMMARY:  - Follow up with family to discuss treatment options  - Address management options for cervical radiculopathy during family discussion  - Schedule follow-up visit to review treatment plan with family                The diagnosis, goals, limitations, risks and benefits  of surgery and alternative treatment options where discussed at length (pros/cons). All questions/concerns were addressed. The patient has verbalized a good understanding of the diagnosis, the planned procedure, anticipated post-operative course, overall expectations, and risks including but not limited to:  Dysphagia, dysphonia, Cervical palsy, nerve root injury/paralysis, death, nerve injury leading to pain or neurological deficit, csf leak, vascular injury or serious bleeding/need for blood product transfusion/stroke, wrong level surgery, hardware/instrumenteation misplacement, chronic pain/failure to improve or worsening of symptoms, infection, pseudoarthrosis, hardware failure, need for further surgery at the same or different levels; medical (eg Heart attack, blood clot, infection) and anesthetic complications.   Josr Stephenson MD  Neurosurgery     This note was generated with the assistance [employer]. Dictation feature only used in office. I attest to having reviewed and edited the generated note for accuracy, though some syntax or spelling errors may persist. Please contact the author of this note for any clarification.            [1]   Social History  Tobacco Use    Smoking status: Former     Average packs/day: 0.3 packs/day for 23.7 years (5.9 ttl pk-yrs)     Types: Cigarettes     Start date: 2000    Smokeless tobacco: Never   Substance Use Topics    Alcohol use: Yes     Comment: socially    Drug use: Not Currently

## 2025-05-08 ENCOUNTER — TELEPHONE (OUTPATIENT)
Dept: SURGERY | Facility: HOSPITAL | Age: 40
End: 2025-05-08
Payer: COMMERCIAL

## 2025-05-08 NOTE — TELEPHONE ENCOUNTER
Pt is scheduled for cervical ELIAS tomorrow. States he can't afford procedure, so he needs to cancel. Thank you.

## 2025-05-08 NOTE — TELEPHONE ENCOUNTER
Spoke with patient. Confirmed cancellation of procedure and kept appointment with Tab Medrano PA-C to discuss other treatment options.

## 2025-05-14 ENCOUNTER — TELEPHONE (OUTPATIENT)
Dept: NEUROSURGERY | Facility: CLINIC | Age: 40
End: 2025-05-14
Payer: COMMERCIAL

## 2025-05-14 DIAGNOSIS — M54.12 CERVICAL RADICULOPATHY: ICD-10-CM

## 2025-05-14 NOTE — TELEPHONE ENCOUNTER
----- Message from Ada sent at 5/14/2025  2:13 PM CDT -----  Regarding: Appointment Request-second attempt  Contact: patient at [phone]  Type:  Appointment Request-second attemptName of Caller:  patient at [phone] Information:  Calling to get procedure scheduled with Dr. Stephenson. Please call and advise. Thank you

## 2025-05-15 RX ORDER — HYDROCODONE BITARTRATE AND ACETAMINOPHEN 5; 325 MG/1; MG/1
1 TABLET ORAL EVERY 8 HOURS PRN
Qty: 20 TABLET | Refills: 0 | Status: SHIPPED | OUTPATIENT
Start: 2025-05-15

## 2025-05-19 DIAGNOSIS — R29.898 WEAKNESS OF RIGHT UPPER EXTREMITY: Primary | ICD-10-CM

## 2025-05-19 DIAGNOSIS — M48.02 STENOSIS OF CERVICAL SPINE WITH MYELOPATHY: ICD-10-CM

## 2025-05-19 DIAGNOSIS — Z98.1 S/P CERVICAL SPINAL FUSION: Primary | ICD-10-CM

## 2025-05-19 DIAGNOSIS — G99.2 STENOSIS OF CERVICAL SPINE WITH MYELOPATHY: ICD-10-CM

## 2025-05-19 RX ORDER — CEFAZOLIN SODIUM 2 G/50ML
2 SOLUTION INTRAVENOUS
OUTPATIENT
Start: 2025-05-19

## 2025-05-19 RX ORDER — LIDOCAINE HYDROCHLORIDE 10 MG/ML
1 INJECTION, SOLUTION EPIDURAL; INFILTRATION; INTRACAUDAL; PERINEURAL ONCE
OUTPATIENT
Start: 2025-05-19 | End: 2025-05-19

## 2025-05-19 RX ORDER — SODIUM CHLORIDE, SODIUM LACTATE, POTASSIUM CHLORIDE, CALCIUM CHLORIDE 600; 310; 30; 20 MG/100ML; MG/100ML; MG/100ML; MG/100ML
INJECTION, SOLUTION INTRAVENOUS CONTINUOUS
OUTPATIENT
Start: 2025-05-19

## 2025-05-23 ENCOUNTER — OFFICE VISIT (OUTPATIENT)
Dept: PAIN MEDICINE | Facility: CLINIC | Age: 40
End: 2025-05-23
Payer: COMMERCIAL

## 2025-05-23 VITALS
BODY MASS INDEX: 31.52 KG/M2 | HEIGHT: 68 IN | SYSTOLIC BLOOD PRESSURE: 120 MMHG | WEIGHT: 208 LBS | HEART RATE: 60 BPM | DIASTOLIC BLOOD PRESSURE: 82 MMHG

## 2025-05-23 DIAGNOSIS — M54.12 CERVICAL RADICULOPATHY: Primary | ICD-10-CM

## 2025-05-23 DIAGNOSIS — M54.2 CERVICALGIA: ICD-10-CM

## 2025-05-23 PROCEDURE — 99999 PR PBB SHADOW E&M-EST. PATIENT-LVL II: CPT | Mod: PBBFAC,,,

## 2025-05-23 RX ORDER — HYDROCODONE BITARTRATE AND ACETAMINOPHEN 7.5; 325 MG/1; MG/1
1 TABLET ORAL EVERY 8 HOURS PRN
Qty: 21 TABLET | Refills: 0 | Status: SHIPPED | OUTPATIENT
Start: 2025-05-23 | End: 2025-05-30

## 2025-05-23 RX ORDER — PREGABALIN 150 MG/1
150 CAPSULE ORAL 2 TIMES DAILY
Qty: 60 CAPSULE | Refills: 1 | Status: CANCELLED | OUTPATIENT
Start: 2025-05-23 | End: 2025-06-22

## 2025-05-23 NOTE — PROGRESS NOTES
[facility] Follow Up Evaluation      Referred by: No ref. provider found    PCP:     CC:   No chief complaint on file.         5/1/2025     8:57 AM   Last 3 PDI Scores   Pain Disability Index (PDI) 44     Interval HPI 5/23/2025: Quinn Grey returns to the office for follow up.  He returns for follow-up with continued neck pain, 8/10, constant, worsened with certain movements.  Pain is located in his neck with radiation down right arm with associated numbness and tingling.  He feels weakness in right arm in comparison to left arm.  He denies any new changes to his bowel or bladder function.  He discontinued gabapentin due to no significant relief.  He has been taking hydrocodone 5/325 mg on average once daily at night but does not provide him with significant relief of his pain.      HPI:   Quinn Grey is a 40 y.o. male patient who has a past medical history of Abdominal cramps, ADHD (attention deficit hyperactivity disorder), and COVID-19. He presents with neck and arm pain that began a few months ago after rotating and changing tires on his car. He experienced severe pain the following day. Pain radiates from his neck to his right arm, with numbness extending from his thumb to his neck. He reports weakness in his right arm. His right arm becomes completely numb when bending over. He also notes increased frequency of dropping objects and dexterity issues. His elbow is consistently sore and painful.  Pain severity fluctuates, with episodes of severe pain that significantly limit his activities. Symptoms worsen with bending over and work activities, and improve with rest. He has had to modify his activities due to the pain affecting his ability to perform normal tasks, including work.    He attempted PT but found it ineffective. He also saw a chiropractor, which did not fully resolve his symptoms. Another medical professional prescribed gabapentin (300mg), flexeril (muscle relaxant), and hydrocodone.   About 1.5 weeks ago, he experienced an acute exacerbation of pain. The pain improved slightly over a weekend of rest but worsened again when he returned to work. This cycle continued for about two weeks, prompting him to rest for a week, which provided some relief.       Pain Intervention History:      Past Spine Surgical History:      Past and current medications:  Antineuropathics: gabapentin   NSAIDs: ibuprofen  Physical therapy: yes, completed   Antidepressants:  Muscle relaxers:  Opioids: hydrocodone   Antiplatelets/Anticoagulants:    History:  Current Medications[1]    Past Medical History:   Diagnosis Date    Abdominal cramps 8/9/2022    ADHD (attention deficit hyperactivity disorder) 8/9/2022    COVID-19 9/14/2020       No past surgical history on file.    Family History   Problem Relation Name Age of Onset    No Known Problems Mother      No Known Problems Father      Dementia Maternal Grandfather      Prostate cancer Paternal Grandfather      Pacemaker/defibrilator Paternal Grandfather         Social History     Socioeconomic History    Marital status:    Tobacco Use    Smoking status: Former     Average packs/day: 0.3 packs/day for 23.7 years (5.9 ttl pk-yrs)     Types: Cigarettes     Start date: 2000    Smokeless tobacco: Never   Substance and Sexual Activity    Alcohol use: Yes     Comment: socially    Drug use: Not Currently    Sexual activity: Yes     Partners: Female     Social Drivers of Health     Financial Resource Strain: Patient Declined (2/17/2025)    Overall Financial Resource Strain (CARDIA)     Difficulty of Paying Living Expenses: Patient declined   Food Insecurity: Patient Declined (2/17/2025)    Hunger Vital Sign     Worried About Running Out of Food in the Last Year: Patient declined     Ran Out of Food in the Last Year: Patient declined   Transportation Needs: Patient Declined (2/17/2025)    PRAPARE - Transportation     Lack of Transportation (Medical): Patient declined     Lack  "of Transportation (Non-Medical): Patient declined   Physical Activity: Insufficiently Active (2/17/2025)    Exercise Vital Sign     Days of Exercise per Week: 3 days     Minutes of Exercise per Session: 20 min   Stress: Stress Concern Present (2/17/2025)    [employer] - Occupational Stress Questionnaire     Feeling of Stress : To some extent   Housing Stability: Patient Declined (2/17/2025)    Housing Stability Vital Sign     Unable to Pay for Housing in the Last Year: Patient declined     Homeless in the Last Year: Patient declined       Review of patient's allergies indicates:   Allergen Reactions    Tramadol Other (See Comments)     Nausea        Review of Systems:  12 point review of systems is negative.    Physical Exam:  Vitals:    05/23/25 0837   BP: 120/82   Pulse: 60   Weight: 94.3 kg (208 lb 0.1 oz)   Height: 5' 8" (1.727 m)       Body mass index is 31.63 kg/m².    Gen: NAD  Psych: mood appropriate for given condition  HEENT: eyes anicteric   CV: RRR  HEENT: anicteric   Respiratory: non-labored, no signs of respiratory distress  Abd: non-distended  Skin: warm, dry and intact.  Gait: No antalgic gait.     Sensory:  Intact and symmetrical to light touch in C4-T1 dermatomes bilaterally, except decreased over the right thumb    Motor:    Right Left   C4 Shoulder Abduction  5  5   C5 Elbow Flexion    5  5   C6 Wrist Extension  5  5   C7 Elbow Extension   5  5   C8/T1 Hand Intrinsics   5  5      Right Left   Triceps DTR 2+ 2+   Biceps DTR 0 2+        Patellar DTR 1+ 1+        Edgar Absent  Absent                 Labs:  No results found for: "LABA1C", "HGBA1C"    Lab Results   Component Value Date    WBC 5.36 04/16/2024    HGB 13.9 (L) 04/16/2024    HCT 42.2 04/16/2024    MCV 86 04/16/2024     04/16/2024           Imaging:  MRI cervical spine 4/11/25  FINDINGS:  Posterior fossa which is visualized within normal limits.  Spinal cord is of normal caliber without evidence for " signal abnormality.  No evidence for volume loss.     Straightening the normal lordotic curvature.  The alignment is normal.  The vertebral heights and intervertebral disc spaces maintained.  No evidence for compression deformity, fracture, or subluxation.  Marrow signal pattern is normal.  Disc desiccation is identified without significant intervertebral disc space narrowing.  Predental space and prevertebral soft tissues are grossly normal.     C2-C3: No significant abnormality.  C3-C4: No significant abnormality.  C4-C5 no significant abnormality.  C5-C6: Central disc protrusion eccentric to the right causing minimal moderate central canal stenosis.  Deformity the spinal cord is identified without evidence for signal changes.  Uncovertebral facet arthropathy without significant neural foraminal narrowing.  C6-C7: No significant abnormality.  C7-T1: No significant abnormality.     The visualized soft tissues of neck are grossly normal.  The T2 flow voids are normal.  Slightly atretic appearance of the right vertebral artery.    Diagnosis:  1. Cervical radiculopathy    2. Cervicalgia            Quinn Grey is a 40 y.o. male patient who has a past medical history of Abdominal cramps, ADHD (attention deficit hyperactivity disorder), and COVID-19. He presents with neck and arm pain that began a few months ago after rotating and changing tires on his car. He experienced severe pain the following day. Pain radiates from his neck to his right arm, with numbness extending from his thumb to his neck. He reports weakness in his right arm. His right arm becomes completely numb when bending over. He also notes increased frequency of dropping objects and dexterity issues. His elbow is consistently sore and painful.  Pain severity fluctuates, with episodes of severe pain that significantly limit his activities. Symptoms worsen with bending over and work activities, and improve with rest. He has had to modify his activities  due to the pain affecting his ability to perform normal tasks, including work.    He attempted PT but found it ineffective. He also saw a chiropractor, which did not fully resolve his symptoms. Another medical professional prescribed gabapentin (300mg), flexeril (muscle relaxant), and hydrocodone.  About 1.5 weeks ago, he experienced an acute exacerbation of pain. The pain improved slightly over a weekend of rest but worsened again when he returned to work. This cycle continued for about two weeks, prompting him to rest for a week, which provided some relief.       5/23/2025: Quinn Grey returns to the office for follow up.  He returns for follow-up with continued neck pain, 8/10, constant, worsened with certain movements.  Pain is located in his neck with radiation down right arm with associated numbness and tingling.  He feels weakness in right arm in comparison to left arm.  He denies any new changes to his bowel or bladder function.  He discontinued gabapentin due to no significant relief.  He has been taking hydrocodone 5/325 mg on average once daily at night but does not provide him with significant relief of his pain.      - we reviewed his cervical MRI in office today and at C5-6 he has a large right paracentral disc protrusion causing severe right lateral recess narrowing with impingement on the spinal cord.  There does not appear to be any signal change in the spinal cord  - he is currently scheduled to have cervical C5/6 anterior fusion with Dr. Stephenson in the upcoming weeks.  - in the interim, he is having worsening pain.  We will increase his hydrocodone from 5/325 mg to hydrocodone 7.5/325 mg.  Prescription for hydrocodone 7.5/325 mg 21 tablets sent to Dr. Guadarrama today for approval.  - additionally, for any neuropathic component we will trial Lyrica 150 mg nightly.  Prescription sent.  - he will follow up as needed.  He can reach out to us in the future if he needs another refill prior to surgery.  -  discussed red flags, if he feels like he has worsening weakness or harry weakness in her right arm to report to the emergency room.      : Not applicable      This note was completed with dictation software and grammatical errors may exist.         [1]   Current Outpatient Medications:     cyclobenzaprine (FLEXERIL) 10 MG tablet, Take 1 tablet (10 mg total) by mouth nightly as needed for Muscle spasms., Disp: 30 tablet, Rfl: 0    gabapentin (NEURONTIN) 300 MG capsule, Take 1 capsule (300 mg total) by mouth every evening., Disp: 30 capsule, Rfl: 11    HYDROcodone-acetaminophen (NORCO) 7.5-325 mg per tablet, Take 1 tablet by mouth every 8 (eight) hours as needed for Pain., Disp: 21 tablet, Rfl: 0    ibuprofen (ADVIL,MOTRIN) 800 MG tablet, Take 1 tablet (800 mg total) by mouth 3 (three) times daily as needed for Pain., Disp: 30 tablet, Rfl: 0    LORazepam (ATIVAN) 1 MG tablet, Take 1 tablet (1 mg total) by mouth daily as needed for Anxiety., Disp: 30 tablet, Rfl: 0    zolpidem (AMBIEN) 10 mg Tab, TAKE 1 TABLET(10 MG) BY MOUTH EVERY NIGHT AS NEEDED FOR INSOMNIA, Disp: 30 tablet, Rfl: 5

## 2025-05-23 NOTE — TELEPHONE ENCOUNTER
I have reviewed the [place] [employer] website and there are no abberancies.        He is having worsening pain overall.  He is scheduled to have surgery in the next few weeks with Dr. Stephenson.

## 2025-06-09 PROBLEM — M48.02 STENOSIS OF CERVICAL SPINE WITH MYELOPATHY: Status: ACTIVE | Noted: 2025-06-09

## 2025-06-09 PROBLEM — R29.898 WEAKNESS OF RIGHT UPPER EXTREMITY: Status: ACTIVE | Noted: 2025-06-09

## 2025-06-09 PROBLEM — G99.2 STENOSIS OF CERVICAL SPINE WITH MYELOPATHY: Status: ACTIVE | Noted: 2025-06-09

## 2025-06-12 ENCOUNTER — NURSE TRIAGE (OUTPATIENT)
Dept: ADMINISTRATIVE | Facility: CLINIC | Age: 40
End: 2025-06-12
Payer: COMMERCIAL

## 2025-06-12 NOTE — TELEPHONE ENCOUNTER
Patient with c/o cold pale clammy skin and shivering. Temp 95.3 x3.  Patient is sweating.    Reason for Disposition   Sounds like a serious complication to the triager     Patient with c/o cold pale clammy skin and shivering. Temp 95.3 x3.  Patient is sweating.    Additional Information   Negative: Sounds like a life-threatening emergency to the triager   Negative: Chest pain   Negative: Difficulty breathing   Negative: Acting confused (e.g., disoriented, slurred speech) or excessively sleepy   Negative: [1] Pain or burning with passing urine (urination) AND [2] male   Negative: [1] Pain or burning with passing urine (urination) AND [2] female   Negative: Constipation   Negative: New or worsening leg (calf, thigh) pain   Negative: New or worsening leg swelling   Negative: Dizziness is severe or lasts > 24 hours after surgery   Negative: Pain, redness, swelling, or pus at IV site (current or recent)   Negative: Medication question   Negative: Cast, symptoms or questions about   Negative: Daniel-Vega, Hemovac, or Penrose drain, symptoms or questions about   Negative: Post-op total hip replacement, symptoms or questions about   Negative: Post-op total knee replacement, symptoms or questions about   Negative: Post-op tonsil and adenoid surgery, symptoms or questions about   Negative: Surgical incision symptoms and questions   Negative: Urinary catheter (e.g., coude, Izquierdo), symptoms or questions about   Negative: [1] Widespread rash AND [2] bright red, sunburn-like    Protocols used: Post-Op Symptoms and [id number]

## 2025-06-20 DIAGNOSIS — R29.898 WEAKNESS OF RIGHT UPPER EXTREMITY: ICD-10-CM

## 2025-06-20 DIAGNOSIS — M48.02 STENOSIS OF CERVICAL SPINE WITH MYELOPATHY: ICD-10-CM

## 2025-06-20 DIAGNOSIS — G99.2 STENOSIS OF CERVICAL SPINE WITH MYELOPATHY: ICD-10-CM

## 2025-06-20 RX ORDER — OXYCODONE AND ACETAMINOPHEN 10; 325 MG/1; MG/1
1 TABLET ORAL EVERY 4 HOURS PRN
Qty: 42 TABLET | Refills: 0 | Status: SHIPPED | OUTPATIENT
Start: 2025-06-20

## 2025-07-02 DIAGNOSIS — M48.02 STENOSIS OF CERVICAL SPINE WITH MYELOPATHY: ICD-10-CM

## 2025-07-02 DIAGNOSIS — G99.2 STENOSIS OF CERVICAL SPINE WITH MYELOPATHY: ICD-10-CM

## 2025-07-02 DIAGNOSIS — R29.898 WEAKNESS OF RIGHT UPPER EXTREMITY: ICD-10-CM

## 2025-07-02 RX ORDER — OXYCODONE AND ACETAMINOPHEN 10; 325 MG/1; MG/1
1 TABLET ORAL EVERY 4 HOURS PRN
Qty: 42 TABLET | Refills: 0 | Status: SHIPPED | OUTPATIENT
Start: 2025-07-02

## 2025-07-10 DIAGNOSIS — G99.2 STENOSIS OF CERVICAL SPINE WITH MYELOPATHY: ICD-10-CM

## 2025-07-10 DIAGNOSIS — R29.898 WEAKNESS OF RIGHT UPPER EXTREMITY: ICD-10-CM

## 2025-07-10 DIAGNOSIS — M48.02 STENOSIS OF CERVICAL SPINE WITH MYELOPATHY: ICD-10-CM

## 2025-07-10 RX ORDER — OXYCODONE AND ACETAMINOPHEN 10; 325 MG/1; MG/1
1 TABLET ORAL EVERY 4 HOURS PRN
Qty: 42 TABLET | Refills: 0 | Status: SHIPPED | OUTPATIENT
Start: 2025-07-10

## 2025-07-17 ENCOUNTER — TELEPHONE (OUTPATIENT)
Dept: NEUROSURGERY | Facility: CLINIC | Age: 40
End: 2025-07-17
Payer: COMMERCIAL

## 2025-07-17 NOTE — TELEPHONE ENCOUNTER
Copied from CRM #[id number]. Topic: Appointments - Appointment Rescheduling  >> Jul 17, 2025 10:29 AM Jimy wrote:  Pt is calling the office to have his x-ray and post-op rescheduled to sometime in August, unless it can be moved to this week or Mon/Tues next week. Please call back to advise. Thanks!  Phone: [phone]

## 2025-07-21 DIAGNOSIS — G99.2 STENOSIS OF CERVICAL SPINE WITH MYELOPATHY: ICD-10-CM

## 2025-07-21 DIAGNOSIS — R29.898 WEAKNESS OF RIGHT UPPER EXTREMITY: ICD-10-CM

## 2025-07-21 DIAGNOSIS — M48.02 STENOSIS OF CERVICAL SPINE WITH MYELOPATHY: ICD-10-CM

## 2025-07-23 RX ORDER — OXYCODONE AND ACETAMINOPHEN 10; 325 MG/1; MG/1
1 TABLET ORAL EVERY 4 HOURS PRN
Qty: 42 TABLET | Refills: 0 | Status: SHIPPED | OUTPATIENT
Start: 2025-07-23

## 2025-08-07 ENCOUNTER — OFFICE VISIT (OUTPATIENT)
Dept: NEUROSURGERY | Facility: CLINIC | Age: 40
End: 2025-08-07
Payer: COMMERCIAL

## 2025-08-07 ENCOUNTER — HOSPITAL ENCOUNTER (OUTPATIENT)
Dept: RADIOLOGY | Facility: HOSPITAL | Age: 40
Discharge: HOME | End: 2025-08-07
Attending: STUDENT IN AN ORGANIZED HEALTH CARE EDUCATION/TRAINING PROGRAM
Payer: COMMERCIAL

## 2025-08-07 VITALS
DIASTOLIC BLOOD PRESSURE: 84 MMHG | WEIGHT: 206.38 LBS | RESPIRATION RATE: 18 BRPM | HEIGHT: 68 IN | SYSTOLIC BLOOD PRESSURE: 125 MMHG | HEART RATE: 68 BPM | BODY MASS INDEX: 31.28 KG/M2

## 2025-08-07 DIAGNOSIS — Z98.1 S/P CERVICAL SPINAL FUSION: Primary | ICD-10-CM

## 2025-08-07 DIAGNOSIS — Z98.1 S/P CERVICAL SPINAL FUSION: ICD-10-CM

## 2025-08-07 DIAGNOSIS — M54.12 CERVICAL RADICULOPATHY: ICD-10-CM

## 2025-08-07 PROCEDURE — 3074F SYST BP LT 130 MM HG: CPT | Mod: CPTII,S$GLB,, | Performed by: STUDENT IN AN ORGANIZED HEALTH CARE EDUCATION/TRAINING PROGRAM

## 2025-08-07 PROCEDURE — 72040 X-RAY EXAM NECK SPINE 2-3 VW: CPT | Mod: 26,,, | Performed by: RADIOLOGY

## 2025-08-07 PROCEDURE — 1159F MED LIST DOCD IN RCRD: CPT | Mod: CPTII,S$GLB,, | Performed by: STUDENT IN AN ORGANIZED HEALTH CARE EDUCATION/TRAINING PROGRAM

## 2025-08-07 PROCEDURE — 72040 X-RAY EXAM NECK SPINE 2-3 VW: CPT | Mod: TC,PO

## 2025-08-07 PROCEDURE — 3079F DIAST BP 80-89 MM HG: CPT | Mod: CPTII,S$GLB,, | Performed by: STUDENT IN AN ORGANIZED HEALTH CARE EDUCATION/TRAINING PROGRAM

## 2025-08-07 PROCEDURE — 99024 POSTOP FOLLOW-UP VISIT: CPT | Mod: S$GLB,,, | Performed by: STUDENT IN AN ORGANIZED HEALTH CARE EDUCATION/TRAINING PROGRAM

## 2025-08-07 NOTE — PROGRESS NOTES
[facility] Neurosurgery - [facility]  Clinic Consult     Consult Requested By: Liss Lopez MD, No ref. provider found        SUBJECTIVE:     Chief Complaint:   Chief Complaint   Patient presents with    Follow-up     6 week post-op, ACDF, w/imaging       History of Present Illness:  Quinn Grey is a 40 y.o. male       History of Present Illness    CHIEF COMPLAINT:  Mr. Grey presents today for 6-week follow-up status post C5-6 ACDF    HISTORY OF PRESENT ILLNESS:  He reports significant improvement in neck and arm pain since his C5-6 ACDF. His initial right-sided flare-up has now resolved and he describes an overall feeling of wellness and improvement since the surgical intervention. Preoperatively, he had right tricep weakness and sensory deficits. Long tract signs demonstrated a positive Edgar sign, indicating upper motor neuron pathology. He declines physical therapy at this time and has been advised to withhold from golf. He plans to follow up in 8 to 12 weeks.          Pertinent and Recent history, provider evaluations, imaging and data reviewed in EPIC         Other pertinent ROS negative      Diagnostic Results:  I have independently reviewed the following imaging:          Review of patient's allergies indicates:   Allergen Reactions    Tramadol Other (See Comments)     Nausea        Past Medical History:   Diagnosis Date    Abdominal cramps 8/9/2022    ADHD (attention deficit hyperactivity disorder) 8/9/2022    COVID-19 9/14/2020     Past Surgical History:   Procedure Laterality Date    ANTERIOR CERVICAL DISCECTOMY W/ FUSION N/A 6/9/2025    Procedure: DISCECTOMY, SPINE, CERVICAL, ANTERIOR, W/ ARTHRODESIS C5-C6;  Surgeon: Josr Stephenson MD;  Location: [facility];  Service: Neurosurgery;  Laterality: N/A;    NO PAST SURGERIES       Family History   Problem Relation Name Age of Onset    No Known Problems Mother      No Known Problems Father      Dementia Maternal Grandfather       Prostate cancer Paternal Grandfather      Pacemaker/defibrilator Paternal Grandfather       Social History[1]     Review of Systems:      Constitutional: no fever, chills or night sweats. No abrupt changes in weight   Eyes: no diplopia, lid drooping, loss of vision   ENT: no nasal congestion, sore throat, discharge  Respiratory: no cough, shortness of breath, or pain  Cardiovascular: no chest pain or palpitations   Gastrointestinal: no nausea or vomiting      OBJECTIVE:     Vital Signs (Most Recent):  Pulse: 68 (08/07/25 0852)  Resp: 18 (08/07/25 0852)  BP: 125/84 (08/07/25 0852)    Physical Exam               Physical Exam:      General: well developed, well nourished, no distress. .  Mental Status: Awake, Alert, Oriented x 4  Language: No aphasia  Speech: No dysarthria  Head: normocephalic, atraumatic.  Neck: trachea midline, no JVD   Cardiovascular: no LE edema  Pulmonary: normal respirations, no signs of respiratory distress      Motor Strength:  No abnormal movements seen.     Strength  Deltoids Triceps Biceps Wrist Extension Wrist Flexion Hand  Interossei     Upper: R 5/5 4+/5 5/5 5/5 5/5 5/5 5/5      L 5/5 5/5 5/5 5/5 5/5 5/5 5/5       Iliopsoas Quadriceps  Tibialis  anterior Gastro- cnemius EHL      Lower: R 5/5 5/5  5/5 5/5 5/5       L 5/5 5/5  5/5 5/5 5/5        SILT,PP            ASSESSMENT/PLAN:         Assessment & Plan    IMPRESSION:  - 40-year-old male, 6 weeks post C5-6 ACDF, with pre-op right tricep weakness, sensory deficit, and positive Edgar sign.  - XR shows stable alignment, intact hardware, no subsidence.  - Significant improvement in neck and arm pain, with resolution of initial right-side flare-up.  - Overall assessment: doing well at 6 weeks post-op.          PLAN SUMMARY:  - Mr. Grey advised to refrain from playing golf until further notice.  - Ordered XR Cervical Spine for 4 to 6 months post-op.                Josr Stephenson MD  Neurosurgery     This note was generated with the  assistance of ambient listening technology. Verbal consent was obtained by the patient and accompanying visitor(s) for the recording of patient appointment to facilitate this note. I attest to having reviewed and edited the generated note for accuracy, though some syntax or spelling errors may persist. Please contact the author of this note for any clarification.            [1]   Social History  Tobacco Use    Smoking status: Former     Average packs/day: 0.3 packs/day for 23.7 years (5.9 ttl pk-yrs)     Types: Cigarettes     Start date: 2000    Smokeless tobacco: Never   Vaping Use    Vaping status: Former   Substance Use Topics    Alcohol use: Yes     Comment: socially    Drug use: Not Currently